# Patient Record
Sex: FEMALE | Race: WHITE
[De-identification: names, ages, dates, MRNs, and addresses within clinical notes are randomized per-mention and may not be internally consistent; named-entity substitution may affect disease eponyms.]

---

## 2021-06-09 ENCOUNTER — HOSPITAL ENCOUNTER (OUTPATIENT)
Dept: HOSPITAL 47 - RADMAMWWP | Age: 66
Discharge: HOME | End: 2021-06-09
Attending: OBSTETRICS & GYNECOLOGY
Payer: COMMERCIAL

## 2021-06-09 DIAGNOSIS — Z12.31: Primary | ICD-10-CM

## 2021-06-09 DIAGNOSIS — Z13.820: ICD-10-CM

## 2021-06-09 DIAGNOSIS — Z78.0: ICD-10-CM

## 2021-06-09 PROCEDURE — 77063 BREAST TOMOSYNTHESIS BI: CPT

## 2021-06-09 PROCEDURE — 77080 DXA BONE DENSITY AXIAL: CPT

## 2021-06-09 PROCEDURE — 77067 SCR MAMMO BI INCL CAD: CPT

## 2021-06-09 NOTE — MM
Reason for exam: screening  (asymptomatic).

Last mammogram was performed 1 year and 4 months ago.



History:

Patient is postmenopausal.

Family history of breast cancer in mother at age 57 and breast cancer in maternal 

aunt at age 57.

Benign excisional biopsy of the right breast.

Took hormonal contraceptives for 5 years.



Physical Findings:

A clinical breast exam by your physician is recommended on an annual basis and 

results should be correlated with mammographic findings.



MG 3D Screening Mammo W/Cad

Bilateral CC and MLO view(s) were taken.

Prior study comparison: February 20, 2020, mammogram, performed at [facility].

May 23, 2019, mammogram, performed at [facility].  February 21, 2019, 

mammogram, performed at [facility].  January 22, 2019, mammogram, performed at

[facility].

There are scattered fibroglandular densities.  There are benign appearing round 

calcifications bilaterally. Previous mammotome biopsy in the right breast at 

distortion right lower inner quadrant. 3mm group of indistinct calcifications in 

the left breast slight inner middle depth on MLO 42/87.

This finding is changed when compared with previous exams.





ASSESSMENT: Incomplete: need additional imaging evaluation, BI-RAD 0



RECOMMENDATION:

Special view mammogram of the left breast.



[facility] will attempt to contact patient to return for supplemental 

views.

## 2021-06-09 NOTE — BD
EXAMINATION TYPE: Axial Bone Density

 

DATE OF EXAM: 6/9/2021

 

COMPARISON: NONE

 

CLINICAL HISTORY: Postmenopausal female.

 

Height:  64.5 IN

Weight:  204 LBS

 

 

RISK FACTORS 

HISTORY OF: 

Active: YES

Diet low in dairy products/other sources of calcium:  YES

Postmenopausal woman: AGE 50

Take estrogen and/or progesterone medications: NOT NOW

How long: TOOK BIRTH CONTROL FOR 5 YEARS

 

MEDICATIONS: 

Additional Medications: MULTI VIT, METFORMIN, AMARYL, CRESTOR, HYGROTON, JARDIAN(DIABETES MED) 

 

 

 

EXAM MEASUREMENTS: 

Bone mineral densitometry was performed using the [employer] System.

Bone mineral density as measured about the Lumbar spine is:  

----- L1-L4(G/cm2): 1.509

T Score Values are as follows:

----- L2: 1.9

----- L3: 3.8

----- L4: 2.9

----- L1-L4: 2.7

Bone mineral density BASELINE

 

Bone mineral density about the R hip (g/cm2): 1.175

Bone mineral density about the L hip (g/cm2): 1.098

T Score values are as follows:

-----R Neck: 1.0

-----L Neck: 0.4

-----R Total: 2.4

-----L Total: 2.0

Bone mineral density BASELINE

 

 

IMPRESSION:

Osteopenia (T Score between -2.5 and -1).

 

There is slightly increased risk of fracture and the patient may be considered 

for treatment. 

 

Re-Screen 2-5 years.

 

NOTE:  T-SCORE=SD OF THE YOUNG ADULT MEAN.

## 2021-06-16 ENCOUNTER — HOSPITAL ENCOUNTER (OUTPATIENT)
Dept: HOSPITAL 47 - RADMAMWWP | Age: 66
Discharge: HOME | End: 2021-06-16
Attending: OBSTETRICS & GYNECOLOGY
Payer: COMMERCIAL

## 2021-06-16 DIAGNOSIS — Z80.3: ICD-10-CM

## 2021-06-16 DIAGNOSIS — Z78.0: ICD-10-CM

## 2021-06-16 DIAGNOSIS — R92.1: Primary | ICD-10-CM

## 2021-06-16 PROCEDURE — 77065 DX MAMMO INCL CAD UNI: CPT

## 2021-06-16 PROCEDURE — 77061 BREAST TOMOSYNTHESIS UNI: CPT

## 2021-06-17 NOTE — MM
Reason for exam: additional evaluation requested from abnormal screening.

Last mammogram was performed less than 1 month ago.



History:

Patient is postmenopausal.

Family history of breast cancer in mother at age 57 and breast cancer in maternal 

aunt at age 57.

Benign excisional biopsy of the right breast, 2018.

Took hormonal contraceptives for 5 years.



Physical Findings:

Nurse did not find any significant physical abnormalities on exam.



MG 3D Work Up W/Cad LT

CC with magnification, LM with magnification, and LM view(s) were taken of the 

left breast.

Prior study comparison: June 9, 2021, bilateral MG 3d screening mammo w/cad.  

February 20, 2020, mammogram, performed at [facility].

There are scattered fibroglandular densities.  There is a 3mm grouping of fine 

heterogeneous calcifications left upper inner breast.



These results were verbally communicated with the patient and result sheet given 

to the patient on 6/16/21.





ASSESSMENT: Suspicious, BI-RAD 4



RECOMMENDATION:

Stereotactic core biopsy of the left breast.



Called Dr. Archer's office with mammographic findings and has scheduled an 

appointment for the patient for 7/8/21 at 12:00 with Dr. Morilol.

Biopsy scheduled for 7/9/21 at 8:00.



PRELIMINARY REPORT CALLED AND FAXED TO DR. MORILLO ON 6/17/21.

## 2021-07-08 ENCOUNTER — HOSPITAL ENCOUNTER (OUTPATIENT)
Dept: HOSPITAL 47 - WWCWWP | Age: 66
End: 2021-07-08
Attending: SURGERY
Payer: COMMERCIAL

## 2021-07-08 VITALS
SYSTOLIC BLOOD PRESSURE: 139 MMHG | RESPIRATION RATE: 18 BRPM | HEART RATE: 76 BPM | TEMPERATURE: 98.6 F | DIASTOLIC BLOOD PRESSURE: 83 MMHG

## 2021-07-08 DIAGNOSIS — N60.12: Primary | ICD-10-CM

## 2021-07-08 DIAGNOSIS — Z79.84: ICD-10-CM

## 2021-07-08 DIAGNOSIS — M85.80: ICD-10-CM

## 2021-07-08 DIAGNOSIS — Z80.3: ICD-10-CM

## 2021-07-08 DIAGNOSIS — E78.00: ICD-10-CM

## 2021-07-08 DIAGNOSIS — E11.9: ICD-10-CM

## 2021-07-08 NOTE — P.GSHP
History of Present Illness


H&P Date: 21


Chief Complaint: Abnormal left breast mammogram








     Carla is a 65 -year-old white female seen in consultation for Dr. Dagn regarding a mammographic abnormality in her left breast.  This was a 

routine screening mammogram.  The patient does not feel any lumps masses or 

nodules in either breast.  She is not complaining of any recent breast trauma or

infection.  She had right breast core biopsy done in [place] three years 

ago which was benign.  The patient has had genetic testing done in the past 

secondary to her mother having had breast cancer and she was told her genetic 

testing was negative.  This was done approximately 3 years ago.





Caffiene: pop/iced tea daily


nicotine: none


chocolate: weekly


hormones: BCP: 5 years in past none now





Family History:


mother: breast cancer dx. at 57 breast cancer; colon cancer in 80's


maternal aunt:  of breast cancer at 57


maternal aunts (2): cervical cancer


maternal uncle: lung cancer








Hormonal History:


menarche: 14


, breast fed: yes, age at first birth: 29


menopause: 52


BCP: 5 years


hormones: none





Surgical history:


Cholecystectomy








Medical history:


Diabetes


high cholesterol


Osteopenia





Social history:


Nicotine: Negative 


alcohol: Negative


Drugs: none

















 











- Constitutional


Constitutional: Denies chills, Denies fever





- EENT


Comment: 





bilateral cataracts


Eyes: denies blurred vision


Ears: deny: decreased hearing, tinnitus


Ears, nose, mouth and throat: Denies headache, Denies sore throat





- Breasts


Breasts: bilateral: as per HPI





- Cardiovascular


Cardiovascular: Denies chest pain, Denies shortness of breath





- Respiratory


Comment: 





history of TB at 20, stayed at [facility] for 6 weeks


Respiratory: Denies cough, Denies 7





- Gastrointestinal


Comment: 





colonoscopy : no polyps


Gastrointestinal: Denies abdominal pain, Denies diarrhea, Denies nausea, Denies 

vomiting





- Genitourinary (Female)


Genitourinary: Reports kidney stones





- Menstruation


Menstruation: Reports postmenopausal





- Musculoskeletal


Musculoskeletal: Denies myalgias





- Integumentary


Integumentary: Denies pruritus, Denies rash





- Neurological


Neurological: Denies numbness, Denies weakness





- Psychiatric


Psychiatric: Denies anxiety, Denies depression





- Endocrine


Endocrine: Denies fatigue, Denies weight change





- Hematologic/Lymphatic


Comment: 





none





- Allergic/Immunologic


Allergic/Immunologic: Reports as per HPI





Past Medical History


History of Any Multi-Drug Resistant Organisms: None Reported


Smoking Status: Never smoker





Medications and Allergies


                                Home Medications











 Medication  Instructions  Recorded  Confirmed  Type


 


Chlorthalidone [Hygroton] 50 mg PO DAILY 21 History


 


Empagliflozin [Jardiance] 25 mg PO DAILY 21 History


 


Glimepiride [Amaryl] 2 mg PO BID 21 History


 


Risedronate Sodium 5 mg PO WEEKLY 21 History


 


Rosuvastatin [Crestor] 10 mg PO DAILY 21 History


 


metFORMIN HCL 1,000 mg PO BID 21 History


 


Inulin/Chromium Picolinate [Fiber 1 each PO HS 21 History





Gummies Chew]    








                                    Allergies











Allergy/AdvReac Type Severity Reaction Status Date / Time


 


No Known Allergies Allergy   Verified 21 12:15














Surgical - Exam


                                   Vital Signs











Temp Pulse Resp BP Pulse Ox


 


 98.6 F   76   18   139/83   98 


 


 21 12:12  21 12:12  21 12:12  21 12:12  21 12:12














BMI 33.3





- General


well developed, well nourished, no distress





- Eyes


normal ocular movement





- ENT


no hearing loss, no congestion





- Neck


trachea midline





- Respiratory


normal respiratory effort, clear to auscultation





- Cardiovascular


Rhythm: regular


Heart Sounds: normal: S1, S2





- Abdomen


Abdomen: soft, non tender, no guarding, no rigid, no rebound





- Integumentary





normal turgor; patient has multiple nevi in the area of concern was noted in the

mid right back





- Neurologic


no disoriented, no combative





- Musculoskeletal


normal gait





- Psychiatric


oriented to time, oriented to person, oriented to place, speech is normal, 

memory intact





Breast exam:


BRA: 42C


inspection: Bilateral grade 3 ptosis


Palpation:


Right breast: Multi-positional exam fibrocystic changes no dominant masses or 

nodules of concern


Right axilla: No adenopathy of concern


Left breast: Multi-positional exam fibrocystic changes, no dominant masses or 

nodules of concern


Left axilla: No adenopathy of concern











Results





Mammogram was reviewed with Dr. Megan, area of microcalcification left breast for 

which stereotactic core biopsy is recommended





Assessment and Plan


Assessment: 





Impression:


Diabetes


high cholesterol


Osteopenia


Microcalcifications of concern left breast


Fibrocystic breast changes


Positive family history of breast cancer





Plan:


1.  Left breast stereotactic core biopsy


2.  Have requested the patient bring her genetic testing results for us to 

evaluate





Cc: Dr. Dnag,   Prior





Risk and benefits of stereotactic core biopsy discussed with the patient.  She 

understands and wishes to proceed.  Risks include but are not limited to 

bleeding, infection, reaction to the anesthetic.  The area were not adequately 

sampled then further testing may be necessary.

## 2021-07-09 ENCOUNTER — HOSPITAL ENCOUNTER (OUTPATIENT)
Dept: HOSPITAL 47 - RADMAMWWP | Age: 66
End: 2021-07-09
Attending: SURGERY
Payer: COMMERCIAL

## 2021-07-09 VITALS — TEMPERATURE: 98.6 F | DIASTOLIC BLOOD PRESSURE: 81 MMHG | HEART RATE: 71 BPM | SYSTOLIC BLOOD PRESSURE: 126 MMHG

## 2021-07-09 VITALS — RESPIRATION RATE: 16 BRPM

## 2021-07-09 DIAGNOSIS — N60.12: Primary | ICD-10-CM

## 2021-07-09 DIAGNOSIS — R92.1: ICD-10-CM

## 2021-07-09 DIAGNOSIS — R92.0: ICD-10-CM

## 2021-07-09 DIAGNOSIS — N62: ICD-10-CM

## 2021-07-09 PROCEDURE — 19081 BX BREAST 1ST LESION STRTCTC: CPT

## 2021-07-09 PROCEDURE — 88305 TISSUE EXAM BY PATHOLOGIST: CPT

## 2021-07-09 NOTE — P.PCN
Date of Procedure: 07/09/21


Preoperative Diagnosis: 


Microcalcifications of concern left breast upper inner quadrant


Postoperative Diagnosis: 


Same


Procedure(s) Performed: 


Stereotactic core biopsy left breast


Anesthesia: local


Surgeon: Kristin Morillo


Pathology: other (Breast tissue with microcalcifications of concern contained in

specimen)


Condition: stable


Disposition: same day


Indications for Procedure: 


Microcalcifications of concern left breast upper inner quadrant


Operative Findings: 


Radiographic specimen reveals microcalcifications of concern


Description of Procedure: 


     Carla is a 65-year-old white female who was noted to have 

microcalcifications of concern in her left breast in the upper inner quadrant.  

Recommendation for stereotactic core biopsy was made.  Risks and benefits of the

procedure were discussed with the patient.  Risks include but are not limited to

bleeding, infection, reaction to the anesthetic.  Alternatives such as watchful 

waiting or open resection were considered but  not recommended.  The patient 

agreed to the procedure.





     The patient was brought to the stereotactic core biopsy room.  She was 

positioned prone on the Lo-rad table.  A  film was obtained using a medial 

to lateral approach.  The area of concern was identified.  The lesion was 

targeted.  The breast was prepped using Betadine.  20 mL of 1% lidocaine were 

used to anesthetize the area of concern.  A 9-gauge vacuum-assisted core 

rotating biopsy needle was driven to the correct coordinates.  Pre-fire films 

were obtained.  The needle was noted to be in the correct location.  The needle 

was fired.  Post fire films were obtained.  The needle was noted to be in the 

correct location.  Specimens were obtained from the 9 to 3 o'clock position with

3 specimens at 12:00.  A total of 9 specimens were obtained.  Radiograph of the 

specimen revealed that the microcalcifications of concern had been adequately 

sampled.  A tri domenico bowtie marker was placed.  This was noted to be in the 

correct location radiographically.  The patient tolerated the procedure in 

stable condition.  The specimen was sent for pathology.  The patient will 

follow-up with Dr. Edwards next week.

## 2021-07-09 NOTE — MM
EXAMINATION TYPE: MG stereo VAD BX LT

 

DATE OF EXAM: 7/9/2021

 

COMPARISON: NONE

 

CLINICAL HISTORY: Stereotactic guided biopsy for calcification of the left 
breast

 

TECHNIQUE: Stereotactic guided core biopsy of left breast.  

 

FINDINGS: The procedure of stereotactic guided core biopsy was explained to the 
patient.  Benefits, alternatives, and risks were discussed.  An informed consent
was then obtained.  

 

[facility]  Shortness pathway was cc approach.
I performed the localization, then surgeon, Dr. Jerry Sales performed the 
remainder of the procedure.  A vacuum assisted biopsy gun was used to obtain 
multiple core samples.   

 

The patient tolerated the procedure well without any immediate complication.  
The patient was kept in the radiology department for short stay after the 
procedure and then discharged home in stable condition.  Targeted calcifications
are identified in specimen mammogram.  Post biopsy mammogram shows the clip to 
appear in satisfactory position relative to the targeted area of concern on the 
preprocedure images.  

 

IMPRESSION: 

 

SUCCESSFUL, UNCOMPLICATED STEREOTACTIC GUIDED CORE BIOPSY OF AREA OF CONCERN IN 
THE Left BREAST, FULL PATHOLOGY RESULTS TO FOLLOW.  

 

Pathology Results: Benign



LEFT BREAST, STEREOTACTIC CORE BIOPSY: Fibroadenomatoid hyperplasia with 
calcifications in a background of fibrocystic changes including columnar cell 
change and mild usual type ductal hyperplasia.  



Recommendation

Follow up mammogram of the left breast in 6 months.

ESTEPHANIAD

## 2021-07-15 ENCOUNTER — HOSPITAL ENCOUNTER (OUTPATIENT)
Dept: HOSPITAL 47 - WWCWWP | Age: 66
End: 2021-07-15
Attending: SURGERY
Payer: COMMERCIAL

## 2021-07-15 VITALS
DIASTOLIC BLOOD PRESSURE: 88 MMHG | RESPIRATION RATE: 16 BRPM | TEMPERATURE: 98.3 F | SYSTOLIC BLOOD PRESSURE: 146 MMHG | HEART RATE: 74 BPM

## 2021-07-15 DIAGNOSIS — Z09: Primary | ICD-10-CM

## 2021-07-15 DIAGNOSIS — Z90.79: ICD-10-CM

## 2021-07-15 NOTE — P.PN
Subjective


Progress Note Date: 07/15/21


Principal diagnosis: 





 fibrocystic breast changes





     Carla is a 65 year old white female status post stereotactic core biopsy

and 7921.  This was felt to be benign concordant it showed fibroadenomatoid 

hyperplasia with calcifications.  The patient tolerated the procedure with no 

difficulty.





Objective





- Vital Signs


Vital signs: 


                                   Vital Signs











Temp  98.3 F   07/15/21 11:23


 


Pulse  74   07/15/21 11:23


 


Resp  16   07/15/21 11:23


 


BP  146/88   07/15/21 11:23


 


Pulse Ox  95   07/15/21 11:23








                                 Intake & Output











 07/14/21 07/15/21 07/15/21





 18:59 06:59 18:59


 


Weight   90.718 kg














- Constitutional


General appearance: Present: cooperative





- EENT


Eyes: Present: EOMI





- Neck


Neck: Present: normal ROM





- Respiratory


Respiratory: bilateral: CTA





- Cardiovascular


Heart sounds: normal: S1, S2





- Integumentary


Integumentary Comment(s): 





Biopsy site right breast clean and dry no evidence of hematoma or infection





Assessment and Plan


Assessment: 





Dressin.  Patient status post left prostatectomy core biopsy pathology benign





Plan:


1.  Repeat left breast mammogram in 6 months with physician exam at that time





CC: Dr. Farhad Steen, Dr. Dang

## 2021-12-13 ENCOUNTER — HOSPITAL ENCOUNTER (OUTPATIENT)
Dept: HOSPITAL 47 - RADMAMWWP | Age: 66
Discharge: HOME | End: 2021-12-13
Attending: SURGERY
Payer: COMMERCIAL

## 2021-12-13 DIAGNOSIS — R92.8: Primary | ICD-10-CM

## 2021-12-13 DIAGNOSIS — Z78.0: ICD-10-CM

## 2021-12-13 DIAGNOSIS — Z80.3: ICD-10-CM

## 2021-12-13 PROCEDURE — 77065 DX MAMMO INCL CAD UNI: CPT

## 2021-12-13 NOTE — MM
Reason for exam: follow-up at short interval from prior study.

Last mammogram was performed 6 months ago.



History:

Patient is postmenopausal.

Family history of breast cancer in mother at age 57 and breast cancer in maternal 

aunt at age 57.

Benign MG stereo VAD BX LT of the left breast, July 9, 2021.  Benign excisional 

biopsy of the right breast, 2018.

Took hormonal contraceptives for 5 years.



Physical Findings:

Nurse did not find any significant physical abnormalities on exam.



MG Diagnostic Mammo LT w CAD

CC and MLO view(s) were taken of the left breast.

Prior study comparison: June 16, 2021, left breast MG 3d work up w/cad LT.  June 9, 2021, bilateral MG 3d screening mammo w/cad.

There are scattered fibroglandular densities.  Previous mammotome biopsy in the 

left breast.

No significant new findings when compared with previous films.



These results were verbally communicated with the patient and result sheet given 

to the patient on 12/13/21.





ASSESSMENT: Benign, BI-RAD 2



RECOMMENDATION:

Return to routine screening mammogram schedule for both breasts.

Back on schedule.

## 2021-12-16 ENCOUNTER — HOSPITAL ENCOUNTER (OUTPATIENT)
Dept: HOSPITAL 47 - WWCWWP | Age: 66
End: 2021-12-16
Attending: SURGERY
Payer: COMMERCIAL

## 2021-12-16 VITALS
RESPIRATION RATE: 16 BRPM | DIASTOLIC BLOOD PRESSURE: 85 MMHG | TEMPERATURE: 98.1 F | SYSTOLIC BLOOD PRESSURE: 136 MMHG | HEART RATE: 98 BPM

## 2021-12-16 DIAGNOSIS — N60.12: Primary | ICD-10-CM

## 2021-12-16 DIAGNOSIS — Z79.4: ICD-10-CM

## 2021-12-16 DIAGNOSIS — E11.9: ICD-10-CM

## 2021-12-16 DIAGNOSIS — Z79.899: ICD-10-CM

## 2021-12-16 DIAGNOSIS — E78.00: ICD-10-CM

## 2021-12-16 DIAGNOSIS — Z79.84: ICD-10-CM

## 2021-12-16 NOTE — P.PN
Subjective


Progress Note Date: 21


Principal diagnosis: 





Fibrocystic breast changes


  Carla is a 66 -year-old white female seen initially in consultation for Dr. Dang regarding a mammographic abnormality in her left breast.  This was on a 

routine screening mammogram.  The patient did not feel any lumps masses or 

nodules in either breast.  She was not complaining of any recent breast trauma 

or infection.  She had right breast core biopsy done in [place] in the 

past  which was benign.  The patient has had genetic testing done in the past 

secondary to her mother having had breast cancer and she was told her genetic 

testing was negative.  This was done abourt  years ago.





She underwent a left breast stero biopsy on 21 which was benign.  A repeat 

left breast mammogram was done on 21 which was BIRAD 2.  Is not 

complaining of any lumps masses or nodules in either breast.





Caffiene: pop/iced tea daily


nicotine: none


chocolate: weekly


hormones: BCP: 5 years in past none now





Family History:


mother: breast cancer dx. at 57 breast cancer; colon cancer in 80's


maternal aunt:  of breast cancer at 57


maternal aunts (2): cervical cancer


maternal uncle: lung cancer








Hormonal History:


menarche: 14


, breast fed: yes, age at first birth: 29


menopause: 52


BCP: 5 years


hormones: none





Surgical history:


Cholecystectomy








Medical history:


Diabetes


high cholesterol


Osteopenia





Social history:


Nicotine: Negative 


alcohol: Negative


Drugs: none

















 











- Constitutional


Constitutional: Denies chills, Denies fever





- EENT


Comment: 





bilateral cataracts


Eyes: denies blurred vision


Ears: deny: decreased hearing, tinnitus


Ears, nose, mouth and throat: Denies headache, Denies sore throat





- Breasts


Breasts: bilateral: as per HPI





- Cardiovascular


Cardiovascular: Denies chest pain, Denies shortness of breath





- Respiratory


Comment: 





history of TB at 20, stayed at [facility] for 6 weeks


Respiratory: Denies cough,





- Gastrointestinal


Comment: 





colonoscopy : no polyps


Gastrointestinal: Denies abdominal pain, Denies diarrhea, Denies nausea, Denies 

vomiting





- Genitourinary (Female)


Genitourinary: Reports kidney stones





- Menstruation


Menstruation: Reports postmenopausal





- Musculoskeletal


Musculoskeletal: Denies myalgias





- Integumentary


Integumentary: Denies pruritus, Denies rash





- Neurological


Neurological: Denies numbness, Denies weakness





- Psychiatric


Psychiatric: Denies anxiety, Denies depression





- Endocrine


Endocrine: Denies fatigue, Denies weight change





- Hematologic/Lymphatic


Comment: 





none





- Allergic/Immunologic


Allergic/Immunologic: Reports as per HPI











Objective





- Vital Signs


Vital signs: 


                                   Vital Signs











Temp  98.1 F   21 10:49


 


Pulse  98   21 10:49


 


Resp  16   21 10:49


 


BP  136/85   21 10:49


 


Pulse Ox      








                                 Intake & Output











 12/15/21 12/16/21 12/16/21





 18:59 06:59 18:59


 


Weight   83.007 kg














- Exam





BMI 30.5





- Constitutional


General appearance: Present: cooperative





- EENT


Eyes: Present: EOMI


ENT: Present: hearing grossly normal





- Neck


Neck: Present: normal ROM





- Respiratory


Respiratory: bilateral: CTA





- Cardiovascular


Heart sounds: normal: S1, S2





- Gastrointestinal


General gastrointestinal: Present: soft





- Integumentary


Integumentary: Present: normal turgor





- Musculoskeletal


Musculoskeletal: Present: gait normal





- Psychiatric


Psychiatric: Present: A&O x's 3, appropriate affect, intact judgment & insight





- Additional findings


Additional findings: 





Breast Exam:


BRA: 42C


inspection: bilateral grade 3 ptosis


palpation: 


right breast: Positional exam fibrocystic changes no dominant masses or nodules 

of concern


Right axilla: No adenopathy of concern


Left breast: Multi-positional exam fibrocystic changes no dominant masses or 

nodules of concern


Left axilla: No adenopathy of concern





Assessment and Plan


Assessment: 





Impression:


Fibrocystic breast changes


Recent left breast mammogram benign BIRADS 2





Plan: Bilateral mammogram in 6 months with physician exam at that time





CC: Dr. Steen

## 2022-09-15 ENCOUNTER — HOSPITAL ENCOUNTER (OUTPATIENT)
Dept: HOSPITAL 47 - WWCWWP | Age: 67
End: 2022-09-15
Attending: SURGERY
Payer: COMMERCIAL

## 2022-09-15 VITALS
DIASTOLIC BLOOD PRESSURE: 78 MMHG | TEMPERATURE: 97.7 F | RESPIRATION RATE: 16 BRPM | SYSTOLIC BLOOD PRESSURE: 155 MMHG | HEART RATE: 79 BPM

## 2022-09-15 DIAGNOSIS — E78.00: ICD-10-CM

## 2022-09-15 DIAGNOSIS — E11.9: ICD-10-CM

## 2022-09-15 DIAGNOSIS — M85.80: ICD-10-CM

## 2022-09-15 DIAGNOSIS — N60.12: ICD-10-CM

## 2022-09-15 DIAGNOSIS — N60.11: Primary | ICD-10-CM

## 2022-09-15 NOTE — P.PN
Subjective


Progress Note Date: 09/15/22


Principal diagnosis: 





fibrocystic breast disease


Fibrocystic breast changes


  Carla is a 67 -year-old white female seen initially in consultation for Dr. Dang regarding a mammographic abnormality in her left breast.  This was on a 

routine screening mammogram.  The patient did not feel any lumps masses or 

nodules in either breast.  She was not complaining of any recent breast trauma 

or infection.  She had right breast core biopsy done in [place] in the 

past  which was benign.  The patient has had genetic testing done in the past 

secondary to her mother having had breast cancer and she was told her genetic 

testing was negative.  This was done about 31/2 years ago.





She underwent a left breast stero biopsy on 21 which was benign.  A repeat 

left breast mammogram was done on 21 which was BIRAD 2.  





Her most recent mammogram was on 22 which was personally reviewed.  This 

was BIRAD 2. She is not complaining of any lumps masses or nodules in either 

breast.





Risk evaluation revealed a five-year risk of 4.9%.  I discussed this with the 

patient and she has declined chemoprevention at this time.





Caffiene: pop/iced tea daily


nicotine: none


chocolate: weekly


hormones: BCP: 5 years in past none now





Family History:


mother: breast cancer dx. at 57 breast cancer; colon cancer in 80's


maternal aunt:  of breast cancer at 57


maternal aunts (2): cervical cancer


maternal uncle: lung cancer








Hormonal History:


menarche: 14


, breast fed: yes, age at first birth: 29


menopause: 52


BCP: 5 years


hormones: none





Surgical history:


Cholecystectomy








Medical history:


Diabetes


high cholesterol


Osteopenia





Social history:


Nicotine: Negative 


alcohol: Negative


Drugs: none

















 











- Constitutional


Constitutional: Denies chills, Denies fever





- EENT


Comment: 





bilateral cataracts


Eyes: denies blurred vision


Ears: deny: decreased hearing, tinnitus


Ears, nose, mouth and throat: Denies headache, Denies sore throat





- Breasts


Breasts: bilateral: as per HPI





- Cardiovascular


Cardiovascular: Denies chest pain, Denies shortness of breath





- Respiratory


Comment: 





history of TB at 20, stayed at [facility] for 6 weeks


Respiratory: Denies cough,





- Gastrointestinal


Comment: 





colonoscopy : no polyps


Gastrointestinal: Denies abdominal pain, Denies diarrhea, Denies nausea, Denies 

vomiting





- Genitourinary (Female)


Genitourinary: Reports kidney stones





- Menstruation


Menstruation: Reports postmenopausal





- Musculoskeletal


Musculoskeletal: Denies myalgias





- Integumentary


Integumentary: Denies pruritus, Denies rash





- Neurological


Neurological: Denies numbness, Denies weakness





- Psychiatric


Psychiatric: Denies anxiety, Denies depression





- Endocrine


Endocrine: Denies fatigue, Denies weight change





- Hematologic/Lymphatic


Comment: 





none





- Allergic/Immunologic


Allergic/Immunologic: Reports as per HPI














Objective





- Vital Signs


Vital signs: 


                                   Vital Signs











Temp  97.7 F   09/15/22 11:42


 


Pulse  79   09/15/22 11:42


 


Resp  16   09/15/22 11:42


 


BP  155/78   09/15/22 11:42


 


Pulse Ox  97   09/15/22 11:42


 


FiO2      








                                 Intake & Output











 09/14/22 09/15/22 09/15/22





 18:59 06:59 18:59


 


Weight   86.183 kg














- Exam





BMI: 31.6











- Constitutional


General appearance: Present: cooperative





- EENT


Eyes: Present: EOMI


ENT: Present: hearing grossly normal





- Neck


Neck: Present: normal ROM





- Respiratory


Respiratory: bilateral: CTA





- Cardiovascular


Rhythm: regular


Heart sounds: normal: S1, S2





- Gastrointestinal


General gastrointestinal: Present: soft





- Integumentary


Integumentary: Present: normal turgor





- Musculoskeletal


Musculoskeletal: Present: gait normal





- Psychiatric


Psychiatric: Present: A&O x's 3, appropriate affect, intact judgment & insight





- Additional findings


Additional findings: 





Breast Exam:


Bra: 40B


Inspection: Bilateral grade 3 ptosis


Palpation:


Right breast: Multi-positional exam fibrocystic changes no dominant masses or 

nodules of concern


Right axilla: No adenopathy of concern


Left breast: Multiple positional exam fibrocystic changes no dominant masses or 

nodules of concern


Left axilla: No adenopathy of concern








Assessment and Plan


Assessment: 





Impression:


Recent bilateral mammogram a2222 benign BIRADS 2


Ceci five-year model risk for 0.9%/we have discussed chemoprevention and patient

declined


Fibrocystic changes on breast exam





Plan:


Bilateral mammogram in 1 year with physician exam at that time


Patient to follow up sooner any questions or concerns





CC: DR. Steen

## 2023-10-25 ENCOUNTER — HOSPITAL ENCOUNTER (OUTPATIENT)
Dept: HOSPITAL 47 - RADBDWWP | Age: 68
Discharge: HOME | End: 2023-10-25
Attending: OBSTETRICS & GYNECOLOGY
Payer: MEDICARE

## 2023-10-25 DIAGNOSIS — Z78.0: ICD-10-CM

## 2023-10-25 DIAGNOSIS — M85.9: ICD-10-CM

## 2023-10-25 DIAGNOSIS — Z12.31: Primary | ICD-10-CM

## 2023-10-25 DIAGNOSIS — Z80.3: ICD-10-CM

## 2023-10-25 PROCEDURE — 77067 SCR MAMMO BI INCL CAD: CPT

## 2023-10-25 PROCEDURE — 77063 BREAST TOMOSYNTHESIS BI: CPT

## 2023-10-25 PROCEDURE — 77080 DXA BONE DENSITY AXIAL: CPT

## 2023-10-25 NOTE — BD
EXAMINATION TYPE: Axial Bone Density

 

DATE OF EXAM: 10/25/2023

 

CLINICAL HISTORY: 68 years old Female.  ICD-10 CODE:  M85.9 MENOPAUSAL

 

Height:  64.2 in

Weight:  208 lbs

 

 

RISK FACTORS 

HISTORY OF: 

Active: yes

Diet low in dairy products/other sources of calcium:  yes

Postmenopausal woman: age 49

 

MEDICATIONS: 

Additional Medications: multi vit, diabetes meds, cholesterol meds, blood pressure meds 

 

 

 

EXAM MEASUREMENTS: 

Bone mineral densitometry was performed using the [employer] System.

Bone mineral density as measured about the Lumbar spine is:  

----- L1-L4(G/cm2): 1.586

T Score Values are as follows:

----- L1: 2.8

----- L2: 2.8

----- L3: 4.8

----- L4: 3.1

----- L1-L4: 3.1

 

Z Score Values are as follows:

----- L1: 3.4

----- L2: 3.4

----- L3: 5.4

----- L4: 3.7

----- L1-L4: 4.0

 

Bone mineral density has: Increased 1.6% since study of: 06/09/2021

 

Bone mineral density about the R hip (g/cm2): 1.314

Bone mineral density about the L hip (g/cm2): 1.272

T Score values are as follows:

-----R Neck: 1.1

-----L Neck: 0.3

-----R Total: 2.4

-----L Total: 2.1

 

Z Score values are as follows:

-----R Neck: 2.0

-----L Neck: 1.1

-----R Total: 3.0

-----L Total: 2.6

 

Bone mineral density has: Increased 0.8% since study of: 06/09/2021

 

 

FRAX%s: The graph provided illustrates a 6.0% chance for a major osteoporotic fx and a 0.2% chance fo
r the hips probability for fx in 10 years time.

 

 

 

 

IMPRESSION:

Normal (Values between +1 and -1 indicate normal bone mass).  Consider repeating this study in 5 year
s or sooner if there is some new clinical indication.

 

NOTE:  T-SCORE=SD OF THE YOUNG ADULT MEAN.

## 2023-10-26 NOTE — MM
Reason for Exam: Screening  (asymptomatic). 

Last mammogram was performed 1 year(s) and 2 month(s) ago. 





Patient History: 

Menarche at age 14. First Full-Term Pregnancy at age 29. Postmenopausal. Patient has history of

breast feeding. Patient used Hormonal Contraceptives for 5 years. 2018, Benign Excisional Biopsy on

the right side. 7/9/2021, Benign Core Biopsy on the left side.

Maternal aunt had breast cancer, age 57. Mother had breast cancer, age 57. 





Risk Values: 

Ceci 5 year model risk: 4.9%.

NCI Lifetime model risk: 16.8%.





Prior Study Comparison: 

6/16/2021 Left Diagnostic Mammogram, [facility]. 12/13/2021 Left Diagnostic Mammogram, [facility]. 8/22/2022

Bilateral MG 3D screening mammo w/cad, [facility]. 





Tissue Density: 

The breast tissue is heterogeneously dense. This may lower the sensitivity of mammography.





Findings: 

Analyzed By CAD. 

Bilateral breast biopsy clips.

There is no suspicious group of microcalcifications or new suspicious mass. 





Overall Assessment: Benign, BI-RAD 2





Management: 

Screening Mammogram of both breasts in 1 year.

[facility] will attempt to contact patient to return for supplemental views and

ultrasound if indicated.



Patient should continue monthly self-breast exams.  A clinical breast exam by your physician is

recommended on an annual basis.

This exam should not preclude additional follow-up of suspicious palpable abnormalities.



Note on Ceci scores and lifetime risk:

1. A Ceci score greater than 3% is considered moderate risk. If this is the case, consider

specialist referral to assess eligibility for a risk reducing agent.

2. If overall lifetime risk for the development of breast cancer is 20% or higher, the patient may

qualify for future screening with alternating mammogram and breast MRI.



Electronically signed and approved by: Valentin Cabrera DO

## 2024-10-28 ENCOUNTER — HOSPITAL ENCOUNTER (OUTPATIENT)
Dept: HOSPITAL 47 - RADMAMWWP | Age: 69
Discharge: HOME | End: 2024-10-28
Attending: OBSTETRICS & GYNECOLOGY
Payer: MEDICARE

## 2024-10-28 PROCEDURE — 77063 BREAST TOMOSYNTHESIS BI: CPT

## 2024-10-28 PROCEDURE — 77067 SCR MAMMO BI INCL CAD: CPT

## 2024-10-29 NOTE — MM
Reason for Exam: Screening  (asymptomatic). 

Last screening mammogram was performed 12 month(s) ago.





Patient History: 

Menarche at age 14. First Full-Term Pregnancy at age 29. Postmenopausal. Patient has history of

breast feeding. Patient used Hormonal Contraceptives for 5 years. 2018, Benign Excisional Biopsy on

the right side. 7/9/2021, Benign Core Biopsy on the left side.

Maternal aunt had breast cancer, age 57. Mother had breast cancer, age 57. 





Risk Values: 

Ceci 5 year model risk: 5.0%.

NCI Lifetime model risk: 16.2%.





Prior Study Comparison: 

12/13/2021 Left Diagnostic Mammogram, [facility]. 8/22/2022 Bilateral MG 3D screening mammo w/cad, [facility].

10/25/2023 Bilateral MG 3D screening mammo w/cad, [facility]. 





Tissue Density: 

There are scattered areas of fibroglandular density.





Findings: 

Analyzed By CAD. 

Right breast: There is no suspicious group of microcalcifications or new suspicious mass.



Left breast: There is no suspicious group of microcalcifications or new suspicious mass. 





Overall Assessment: Negative, BI-RAD 1





Management: 

Screening Mammogram of both breasts in 1 year.

[facility] will attempt to contact patient to return for supplemental views and

ultrasound if indicated.



Patient should continue monthly self-breast exams.  A clinical breast exam by your physician is

recommended on an annual basis.

This exam should not preclude additional follow-up of suspicious palpable abnormalities.



Note on Ceci scores and lifetime risk:

1. A Ceci score greater than 3% is considered moderate risk. If this is the case, consider

specialist referral to assess eligibility for a risk reducing agent.

2. If overall lifetime risk for the development of breast cancer is 20% or higher, the patient may

qualify for future screening with alternating mammogram and breast MRI.



[facility], Workstation: [id number], 10/29/2024 8:59 AM.



Electronically signed and approved by: Valentin Cabrera DO